# Patient Record
Sex: FEMALE | Race: BLACK OR AFRICAN AMERICAN
[De-identification: names, ages, dates, MRNs, and addresses within clinical notes are randomized per-mention and may not be internally consistent; named-entity substitution may affect disease eponyms.]

---

## 2017-05-04 ENCOUNTER — HOSPITAL ENCOUNTER (OUTPATIENT)
Dept: HOSPITAL 62 - WI | Age: 57
End: 2017-05-04
Attending: FAMILY MEDICINE
Payer: COMMERCIAL

## 2017-05-04 DIAGNOSIS — Z12.31: Primary | ICD-10-CM

## 2017-05-04 PROCEDURE — G0202 SCR MAMMO BI INCL CAD: HCPCS

## 2017-05-04 PROCEDURE — 77067 SCR MAMMO BI INCL CAD: CPT

## 2018-03-11 ENCOUNTER — HOSPITAL ENCOUNTER (EMERGENCY)
Dept: HOSPITAL 62 - ER | Age: 58
LOS: 1 days | Discharge: HOME | End: 2018-03-12
Payer: COMMERCIAL

## 2018-03-11 DIAGNOSIS — Z23: ICD-10-CM

## 2018-03-11 DIAGNOSIS — Z88.2: ICD-10-CM
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
## 2018-03-11 DIAGNOSIS — Z90.710: ICD-10-CM

## 2018-03-11 DIAGNOSIS — E11.9: ICD-10-CM

## 2018-03-11 DIAGNOSIS — I10: ICD-10-CM

## 2018-03-11 DIAGNOSIS — S91.351A: Primary | ICD-10-CM

## 2018-03-11 PROCEDURE — 90471 IMMUNIZATION ADMIN: CPT

## 2018-03-11 PROCEDURE — 90715 TDAP VACCINE 7 YRS/> IM: CPT

## 2018-03-11 PROCEDURE — 99283 EMERGENCY DEPT VISIT LOW MDM: CPT

## 2018-03-11 NOTE — ER DOCUMENT REPORT
ED Animal Bite





- General


Chief Complaint: Dog Bite


Stated Complaint: DOG BITE


Time Seen by Provider: 03/11/18 23:33


Mode of Arrival: Ambulatory


Information source: Patient


Notes: 





58-year-old female presents to ED for complaint of dog bite to the right foot.  

She states she was at a friend's house and she gave the dog some chicken to eat 

and then went to leave and stepped over the dog and the dog bit her foot.  She 

told the friend that she has not come over there while the dog was in the house 

again.  She states that the dog's shots are all up-to-date with her tetanus is 

not up-to-date.  She has puncture marks and lacerations to the lateral aspect 

of the right foot with bruising almost to the middle of the foot.


TRAVEL OUTSIDE OF THE [place] IN LAST 30 DAYS: No





- HPI


Location of injury: Other - Right foot


Severity of injury: Bitten


Onset: Just prior to arrival


Quality of pain: Sharp, Throbbing


Pain Level: 5


Severity: Severe


Context of attack: Other - Patient was stepping over the dog when the dog bit 

her


Type of animal: Dog


Appearance of animal: Appeared well


Animal's immunizations: UTD


Animal captured or known: Yes


Animal control notified: Yes


Animal control form completed: Yes





- Related Data


Allergies/Adverse Reactions: 


 





amitriptyline [From Elavil] Allergy (Verified 03/11/18 23:40)


 


Sulfa (Sulfonamide Antibiotics) Allergy (Verified 03/11/18 23:40)


 











Past Medical History





- General


Information source: Patient





- Social History


Smoking Status: Unknown if Ever Smoked


Lives with: Family


Family History: Reviewed & Not Pertinent


Patient has suicidal ideation: No


Patient has homicidal ideation: No





- Past Medical History


Cardiac Medical History: Reports: Hx Hypertension


Pulmonary Medical History: Reports: None


Neurological Medical History: Reports: None


Endocrine Medical History: Reports: Hx Diabetes Mellitus Type 2


Renal/ Medical History: Reports: None


Malignancy Medical History: Reports: None


GI Medical History: Reports: None


Musculoskeltal Medical History: Reports Hx Arthritis, Reports Hx 

Musculoskeletal Deformity


Skin Medical History: Reports None


Psychiatric Medical History: Reports: None


Traumatic Medical History: Reports: None


Infectious Medical History: Reports: None


Past Surgical History: Reports: Hx Hysterectomy, Other - Back surgery





- Immunizations


Immunizations up to date: Yes


Hx Diphtheria, Pertussis, Tetanus Vaccination: Yes - 3/11/2018





Review of Systems





- Review of Systems


Constitutional: No symptoms reported


EENT: No symptoms reported


Cardiovascular: No symptoms reported


Respiratory: No symptoms reported


Gastrointestinal: No symptoms reported


Genitourinary: No symptoms reported


Female Genitourinary: No symptoms reported


Musculoskeletal: Other - Right foot pain swelling bruising


Skin: Other - Right foot dog bite puncture wounds and lacerations


Hematologic/Lymphatic: No symptoms reported


Neurological/Psychological: No symptoms reported


-: Yes All other systems reviewed and negative





Physical Exam





- Vital signs


Vitals: 


 











Temp Pulse Resp BP Pulse Ox


 


 97.7 F   71   18   145/84 H  98 


 


 03/11/18 22:24  03/11/18 22:24  03/11/18 22:24  03/11/18 22:24  03/11/18 22:24











Interpretation: Normal





- General


General appearance: Appears well, Alert





- HEENT


Head: Normocephalic, Atraumatic


Eyes: Normal


Pupils: PERRL





- Respiratory


Respiratory status: No respiratory distress


Chest status: Nontender


Breath sounds: Normal


Chest palpation: Normal





- Cardiovascular


Rhythm: Regular


Heart sounds: Normal auscultation


Murmur: No





- Abdominal


Inspection: Normal


Distension: No distension


Bowel sounds: Normal


Tenderness: Nontender


Organomegaly: No organomegaly





- Back


Back: Normal, Nontender





- Extremities


General upper extremity: Normal inspection, Nontender, Normal color, Normal ROM

, Normal temperature


General lower extremity: Normal ROM, Normal temperature.  No: Jerad's sign


Foot: Tender, Ecchymosis, Edema, Laceration, Metatarsal compress. pain, No 

evidence of FB, Puncture wound.  No: Deformity, Nail injury, Navicular 

tenderness





- Neurological


Neuro grossly intact: Yes


Cognition: Normal


Orientation: AAOx4


Mitesh Coma Scale Eye Opening: Spontaneous


[place] Coma Scale Verbal: Oriented


[place] Coma Scale Motor: Obeys Commands


Mitesh Coma Scale Total: 15


Speech: Normal


Motor strength normal: LUE, RUE, LLE, RLE


Sensory: Normal





- Psychological


Associated symptoms: Normal affect, Normal mood





- Skin


Skin Temperature: Warm


Skin Moisture: Dry


Skin Color: Normal





Course





- Re-evaluation


Re-evalutation: 





03/12/18 01:13


It was soaked in Shur-Clens and warm water for 30 minutes rinsed with saline 

and then bacitracin gauze and Coban applied.  Patient was given tetanus 

Augmentin and Norco dispense pack in the emergency room and discharged home 

with prescription for Augmentin.  Patient instructed to follow-up with her 

primary doctor in the next 24-48 hours to have her wound reexamined.  Patient 

was instructed on use of Epson salt soaks 3 times a day and keep foot elevated.

  Patient was discharged home.





- Vital Signs


Vital signs: 


 











Temp Pulse Resp BP Pulse Ox


 


 97.7 F   71   18   145/84 H  98 


 


 03/11/18 22:24  03/11/18 22:24  03/11/18 22:24  03/11/18 22:24  03/11/18 22:24














Discharge





- Discharge


Clinical Impression: 


Dog bite of right foot


Qualifiers:


 Encounter type: initial encounter Qualified Code(s): S91.351A - Open bite, 

right foot, initial encounter





Condition: Stable


Disposition: HOME, SELF-CARE


Additional Instructions: 


Animal Bites





     Animal bites are often heavily contaminated with bacteria.  In spite of 

thorough cleansing and proper treatment, these wounds frequently become 

infected.  Bite wounds of the hands are especially prone to complications.


     Bites are dressed, if possible.  Large wounds may require suturing after 

internal cleansing.  Because of infection risk, some large wounds must remain 

unstitched.  Your doctor is trained to advise you on the best treatment for 

your bite.


     Call the doctor at once if the wound becomes red, swollen, warm, 

increasingly painful, or if it begins to drain.  Danger signs also include red 

streaks up the involved extremity, swollen glands in the groin or under the arm

, or fever and chills.


     The risk of rabies from domestic animals is very low.  Bats, sick animals, 

and wild animals may expose you to rabies.  The physician, or the health 

department, will inform you if you will need to receive the rabies vaccine.





NON-SUTURED LACERATION:


     Your laceration did not require suturing.  Some lacerations cannot be 

sutured because of increased infection risk, while others simply don't need 

stitches because they are shallow or very short.


     Your injury should be protected while it heals.  Usually complete healing 

takes 10 to 14 days.  Keep the dressing clean and dry, and change it every day.


     If you notice increasing pain, redness, swelling, drainage, or tender 

lumps in the armpit or groin above the injury, infection may be present.  You 

should call the doctor at once.





SOAP CLEANSING:


     Gently wash the wound daily using a mild soap (like Ivory, Phisoderm, 

Neutrogena).  Use warm water, rubbing gently until all debris, ooze, and 

crusting have been washed from the wound.  Allow to dry briefly (about 10 

minutes) after cleaning.  Repeat this cleansing at least three times a day for 

the first two days and then once or twice a day.





Elevate the Injury





     Because of the nature of your injury, elevation will be helpful to reduce 

swelling.  This also reduces infection risk in wounds.  Keep the injury up 

above the level of your heart for at least the next 48 hours (or longer if the 

physician recommends it).








ANTIBIOTIC OINTMENT PROTECTION:


     Your wounds are such that dressing them is not practical or optional.  

After cleansing, you should apply a thin coating of antibiotic ointment (

Bacitracin, not Neosporin) to the wounds at least three times daily.  This 

lessens infection risk, and may decrease the amount of scarring.  Use a q-tip 

or dull butter knife, not your finger, to apply this ointment.


     Any debris or ooze which builds up in the ointment should be gently rubbed 

off with a sterile gauze pad.  Harder crusting may need to be gently scrubbed 

off with a clean wash cloth with soap and warm water, perhaps applying a warm, 

wet wash cloth to the wound for ten minutes first.


     Development of redness, severe itching, or blistering may mean allergy to 

the ointment.  See the doctor.





Epsom Salt Soaks





     Soak the wound area in a container of warm epsom salt water.  If you can't 

get the wound area into a bucket or pan, use a folded towel soaked in the epsom 

salt solution and apply to the area.


     Use clean hot tap water (about the temperature of a very warm bath), 

mixing in about one (1) teaspoon for every pint of water.


           Two gallon --> 16 teaspoons Epsom Salts


           One gallon -->  8 teaspoons Epsom Salts


           Two quarts -->  4 teaspoons Epsom Salts


           One quart  -->  2 teaspoons Epsom Salts


     Soak the wound for about 20 minutes while gently moving it around in the 

water.  Repeat this four (4) times a day.





TETANUS IMMUNIZATION GIVEN:


     You have been given an immunization against tetanus.  Please record this 

in your records.  In general, a booster is needed only once every 10 years.  

The tetanus shot protects against tetanus or "lockjaw," which is a complication 

of certain wound infections (the tetanus shot cannot protect against the actual 

infection).


     The immunization site may become warm and red due to local reaction.  If 

this occurs, apply warm compresses and take aspirin or ibuprofen to reduce 

inflammation and discomfort.  Return for evaluation if the reaction becomes 

severe.





Augmentin





     Augmentin is a mixture of amoxicillin and clavulanate. Amoxicillin is a 

member of the penicillin family.  It covers the germs likely to cause ear, 

bronchial, and urinary infections better than plain penicillin.  The addition 

of clavulanate allows it to cover staph infections of the skin, as well as 

resistant cases of ear and sinus infections.  Your physician has chosen 

Augmentin for you because of the special nature of your situation.


     Augmentin is best taken with meals.  Nausea after taking the medication is 

rare, but can occur.  Diarrhea can occur, particularly in small children.  

Vaginal yeast infections, and oral thrush in infants are also common.  Contact 

your physician if these problems occur.


     Allergy to penicillins is common.  If you have had an allergic reaction to 

any drug of the penicillin family, you should never take any other penicillin.  

Notify your doctor at once if you develop hives, shortness of breath, swelling, 

or faintness.





ORAL NARCOTIC MEDICATION:


     You have been given a Norco dispense pack for pain control.  This 

medication is a narcotic.  It's best taken with food, as nausea can result if 

taken on an empty stomach.


     Don't operate machinery or drive within six hours of taking this 

medication.  Do not combine this medicine with alcohol, or with any medication 

which can cause sedation (such as cold tablets or sleeping pills) unless you 

get permission from the physician.


     Narcotics tend to cause constipation.  If possible, drink plenty of fluids 

and eat a diet high in fiber and fruits.








FOLLOW-UP CARE:


     Please return in ___3__ days for an infection check and dressing change.





     If you have been referred to another physician for follow-up care, call 

that physicians office for an appointment as you were instructed.  If you 

experience a significant change in your laceration, or if you are concerned 

there may be an infection (swelling, redness, drainage, increasing tenderness, 

red streaks, tender lumps in the armpit or groin above the laceration, or fever)

, return to the Emergency Department immediately re-evaluation.








Prescriptions: 


Amox Tr/Potassium Clavulanate [Augmentin 875-125 Tablet] 1 tab PO BID 10 Days  

tablet


Forms:  Elevated Blood Pressure, Return to Work


Referrals: 


ITALO KING MD [Primary Care Provider] - 03/13/18

## 2018-03-12 VITALS — DIASTOLIC BLOOD PRESSURE: 86 MMHG | SYSTOLIC BLOOD PRESSURE: 139 MMHG

## 2018-03-12 NOTE — RADIOLOGY REPORT (SQ)
EXAM DESCRIPTION:  FOOT RIGHT COMPLETE



COMPLETED DATE/TIME:  3/11/2018 11:57 pm



REASON FOR STUDY:  Dog bite   , laceration.



COMPARISON:  None.



NUMBER OF VIEWS:  Three views.



TECHNIQUE:  AP, lateral and oblique  radiographic images acquired of the right foot.



LIMITATIONS:  None.



FINDINGS:  MINERALIZATION: Normal.

BONES: No acute fracture or dislocation.  There is calcaneal enthesopathy.

SOFT TISSUES: Skin irregularity at the lateral aspect of the foot, along the 5th metatarsal.  No radi
opaque foreign body.



IMPRESSION:  No radiographic evidence for acute fracture or radiopaque foreign body.  Skin irregulari
ty at the lateral aspect of the foot, along the 5th metatarsal, probably corresponding to known lacer
ation.



TECHNICAL DOCUMENTATION:  JOB ID:  [id number]

OH-[id number]

 2011 [employer]- All Rights Reserved



Reading location - IP/workstation name: [email or web address]

## 2020-09-25 ENCOUNTER — HOSPITAL ENCOUNTER (OUTPATIENT)
Dept: HOSPITAL 62 - RDC | Age: 60
End: 2020-09-25
Attending: NURSE PRACTITIONER
Payer: COMMERCIAL

## 2020-09-25 VITALS — SYSTOLIC BLOOD PRESSURE: 120 MMHG | DIASTOLIC BLOOD PRESSURE: 85 MMHG

## 2020-09-25 DIAGNOSIS — Z88.8: ICD-10-CM

## 2020-09-25 DIAGNOSIS — E11.9: ICD-10-CM

## 2020-09-25 DIAGNOSIS — I10: ICD-10-CM

## 2020-09-25 DIAGNOSIS — Z20.828: Primary | ICD-10-CM

## 2020-09-25 DIAGNOSIS — Z88.1: ICD-10-CM

## 2020-09-25 PROCEDURE — 99211 OFF/OP EST MAY X REQ PHY/QHP: CPT

## 2020-09-25 PROCEDURE — 87635 SARS-COV-2 COVID-19 AMP PRB: CPT

## 2020-09-25 PROCEDURE — C9803 HOPD COVID-19 SPEC COLLECT: HCPCS

## 2020-09-25 PROCEDURE — 99201: CPT

## 2020-09-25 NOTE — ER RDC ASSESSMENT REPORT
Intake





- In the Last 14 days


Have you traveled outside [place]?: No


Have you been in close contact with someone CONFIRMED: Yes


Worked in Healthcare?: No





- Symptoms


Subjective Fever(Felt feverish): No


Chills: No


Muscule Aches: No


Runny Nose: No


Sore Throat: No


Cough (New or worsening chronic cough): No


Shortness of breath: No


Nausea or Vomiting: No


Headache: No


Abdominal Pain: No


Diarrhea(3 or more loose stools in last 24 hours): No





- Do you have any of the following


Chronic lung disease: Asthma or emphysema or COPD: No


Cystic Fibrosis: No


Diabetes: Yes


High Blood Pressure: Yes


Cardiovascular Disease: Yes


Chronic Kidney Disease: No


Chronic Liver Disease: No


Chronic blood disorder like Sickle Cell Disease: No


Weak immune system due to disease or medication: No


Neurologic condition that limits movement: No


Developmental delay - Moderate to Severe: No


Recent (within past 2 weeks) or current Pregnancy: No


Morbid Obesity (>100 pounds over ideal weight): No





- Objective


Temperature: 97.8 F


Pulse Rate: 63


Respiratory Rate: 16


Blood Pressure: 120/85


O2 Sat by Pulse Oximetry: 97


Objective: 


Given above, testing performed: 


covid


Disposition: Home; Selfcare





General





- General


Stated Complaint: covid test


Time Seen by Provider: 09/25/20 13:15


Mode of Arrival: Ambulatory


Information source: Patient





- HPI


Notes: 





Patient presents to clinic for COVID-19 testing after coming in close contact 

with another COVID 19 positive individual. Patient is asymptomatic.  They deny 

any cough, shortness of breath, fever, chills, muscle aches, rhinorrhea, sore 

throat, nausea or vomiting, headache, abdominal pain or diarrhea.  Patient has 

no acute medical concerns.





- Related Data


Allergies/Adverse Reactions: 


                                        





amitriptyline [From Elavil] Allergy (Verified 03/11/18 23:40)


   


Sulfa (Sulfonamide Antibiotics) Allergy (Verified 03/11/18 23:40)


   











Past Medical History





- General


Information source: Patient





- Social History


Smoking Status: Never Smoker


Family History: Reviewed & Not Pertinent





- Past Medical History


Cardiac Medical History: Reports: Hx Hypertension


Pulmonary Medical History: Reports: None


EENT Medical History: Reports: None


Neurological Medical History: Reports: None


Endocrine Medical History: Reports: Hx Diabetes Mellitus Type 2


Renal/ Medical History: Reports: None.  Denies: Hx Peritoneal Dialysis


Malignancy Medical History: Reports: None


GI Medical History: Reports: None


Musculoskeletal Medical History: Reports Hx Arthritis, Reports Hx 

Musculoskeletal Deformity


Skin Medical History: Reports None


Psychiatric Medical History: Reports: None


Traumatic Medical History: Reports: None


Infectious Medical History: Reports: None


Past Surgical History: Reports: Hx Hysterectomy, Hx Orthopedic Surgery, Other - 

Back surgery





Physical Exam





- General


General appearance: Appears well, Alert


In distress: None


Notes: 





PHYSICAL EXAMINATION: 


GENERAL: Well-appearing and in no acute distress. 


HEAD: Atraumatic, normocephalic. 


EYES: sclera anicteric, conjunctiva are normal. 


ENT: nares patent. Moist mucous membranes. 


NECK: Normal range of motion, supple without lymphadenopathy. 


LUNGS: No increased work of breathing. Lung sounds CTAB and equal. No wheezes 

rales or rhonchi. 


HEART: Regular rate and rhythm without murmurs.


ABDOMEN: Soft, nontender, normal bowel sounds, no guarding. 


EXTREMITIES: Normal range of motion, no pitting edema. No cyanosis. 


NEUROLOGICAL: A&O x 3. Normal speech. 


PSYCH: Normal mood, normal affect. 


SKIN: Warm, Dry, normal turgor, no rashes or lesions noted








Patient Education/Counseling


Counseling/Education: 





Patient presents for COVID 19 testing after close exposure to another person who

has tested positive for COVID 19.  Patient is asymptomatic at this time. Patient

does not have emergency worrying symptoms such as difficulty breathing, 

shortness of breath, chest pain, pressure, confusion or cyanosis.  Patient 

appears suitable for discharge as vital signs are stable and patient is nontoxic

in appearance.  Good return precautions have been discussed with patient, 

patient verbalized understanding and is agreeable with discharge plan of care at

this time.


Guidance for worsening S/SX: 





As a person under investigation for Covid 19, the [place] department of 

Health and Human Services, division of public health advises you to adhere to 

the following guidance until your test results are reported to you.  If your 

test result is positive, you will receive additional information from your 

provider and your local health department at that time.


 


Remain at home until you are cleared by the health provider or public health 

authorities.


 


Keep a log of visitors to your home, notify any visitors to your home of your 

isolation status.


 


If you plan to move to a new address or leave the county, notify the local 

health department in your County.


 


Call your doctor or seek care if you have an urgent medical need.  Before 

seeking medical care, call ahead to get instructions from the provider before 

arriving at the medical office clinic or hospital.  Notify them that you are 

being tested for the virus that causes Covid 19 so that arrangements can be 

made, as necessary, to prevent transmission to others in the healthcare setting.

 Next, notify the local health department in your county.


 


If a medical emergency arises and you need to call 911, inform the first respon

ders that you are being tested for the virus that causes Covid 19.  Next, notify

the local health department in your county.





RDC Discharge





- Discharge


Clinical Impression: 


 Encounter for screening laboratory testing for COVID-19 virus in asymptomatic 

patient





Condition: Good


Disposition: Home; Selfcare